# Patient Record
Sex: MALE | URBAN - METROPOLITAN AREA
[De-identification: names, ages, dates, MRNs, and addresses within clinical notes are randomized per-mention and may not be internally consistent; named-entity substitution may affect disease eponyms.]

---

## 2022-11-02 ENCOUNTER — WEB ENCOUNTER (OUTPATIENT)
Dept: URBAN - METROPOLITAN AREA CLINIC 115 | Facility: CLINIC | Age: 32
End: 2022-11-02

## 2022-11-02 ENCOUNTER — OFFICE VISIT (OUTPATIENT)
Dept: URBAN - METROPOLITAN AREA CLINIC 115 | Facility: CLINIC | Age: 32
End: 2022-11-02
Payer: COMMERCIAL

## 2022-11-02 ENCOUNTER — OFFICE VISIT (OUTPATIENT)
Dept: URBAN - METROPOLITAN AREA CLINIC 115 | Facility: CLINIC | Age: 32
End: 2022-11-02

## 2022-11-02 ENCOUNTER — DASHBOARD ENCOUNTERS (OUTPATIENT)
Age: 32
End: 2022-11-02

## 2022-11-02 VITALS
SYSTOLIC BLOOD PRESSURE: 126 MMHG | DIASTOLIC BLOOD PRESSURE: 83 MMHG | WEIGHT: 224 LBS | HEART RATE: 65 BPM | TEMPERATURE: 97.3 F | BODY MASS INDEX: 33.95 KG/M2 | HEIGHT: 68 IN | RESPIRATION RATE: 14 BRPM

## 2022-11-02 DIAGNOSIS — K62.5 BRBPR (BRIGHT RED BLOOD PER RECTUM): ICD-10-CM

## 2022-11-02 PROBLEM — 162864005: Status: ACTIVE | Noted: 2022-11-02

## 2022-11-02 PROBLEM — 74474003: Status: ACTIVE | Noted: 2022-11-02

## 2022-11-02 PROCEDURE — 99203 OFFICE O/P NEW LOW 30 MIN: CPT | Performed by: INTERNAL MEDICINE

## 2022-11-02 PROCEDURE — 99243 OFF/OP CNSLTJ NEW/EST LOW 30: CPT | Performed by: INTERNAL MEDICINE

## 2022-11-02 NOTE — HPI-TODAY'S VISIT:
33 y/o male patient was referred by Deidra Alexander NP for an evaluation of BRBPR. A copy of this note will be sent to the referring provider.  At this visit, patient admits of 4 episodes starting on 10/3/2022 of BRBPR during wiping but not in toilet. He does admits to constipation, reports 1-2 BMs per day with intermittent straining. States straining now has resovled. Patient also reports of weight gain and bloating with certain food. Denies any abd pain, n/v, nocturnal symptoms, melena or rectal pain. Of note - Paternal grandmother dx with CRC this year at age (mid 70s), currently on chemotherapy. No other family members w/ CRC. PCP physical exam includes negative JACQUELINE for hemorrhoids and negative hemoccult.

## 2022-12-21 ENCOUNTER — OFFICE VISIT (OUTPATIENT)
Dept: URBAN - METROPOLITAN AREA CLINIC 115 | Facility: CLINIC | Age: 32
End: 2022-12-21